# Patient Record
Sex: MALE
[De-identification: names, ages, dates, MRNs, and addresses within clinical notes are randomized per-mention and may not be internally consistent; named-entity substitution may affect disease eponyms.]

---

## 2017-07-02 ENCOUNTER — HOSPITAL ENCOUNTER (EMERGENCY)
Dept: HOSPITAL 31 - C.ER | Age: 52
Discharge: HOME | End: 2017-07-02
Payer: COMMERCIAL

## 2017-07-02 VITALS
SYSTOLIC BLOOD PRESSURE: 126 MMHG | TEMPERATURE: 97.7 F | RESPIRATION RATE: 18 BRPM | DIASTOLIC BLOOD PRESSURE: 84 MMHG | HEART RATE: 60 BPM

## 2017-07-02 VITALS — OXYGEN SATURATION: 98 %

## 2017-07-02 DIAGNOSIS — K59.00: Primary | ICD-10-CM

## 2017-07-02 LAB
ALBUMIN SERPL-MCNC: 3.8 G/DL (ref 3.5–5)
ALBUMIN/GLOB SERPL: 1.2 {RATIO} (ref 1–2.1)
ALT SERPL-CCNC: 39 U/L (ref 21–72)
AST SERPL-CCNC: 18 U/L (ref 17–59)
BASOPHILS # BLD AUTO: 0.1 K/UL (ref 0–0.2)
BASOPHILS NFR BLD: 0.7 % (ref 0–2)
BILIRUB UR-MCNC: NEGATIVE MG/DL
BUN SERPL-MCNC: 13 MG/DL (ref 9–20)
CALCIUM SERPL-MCNC: 7.8 MG/DL (ref 8.6–10.4)
EOSINOPHIL # BLD AUTO: 0.5 K/UL (ref 0–0.7)
EOSINOPHIL NFR BLD: 6.5 % (ref 0–4)
ERYTHROCYTE [DISTWIDTH] IN BLOOD BY AUTOMATED COUNT: 13.3 % (ref 11.5–14.5)
GFR NON-AFRICAN AMERICAN: > 60
GLUCOSE UR STRIP-MCNC: NORMAL MG/DL
HGB BLD-MCNC: 14.4 G/DL (ref 12–18)
LEUKOCYTE ESTERASE UR-ACNC: (no result) LEU/UL
LIPASE: 113 U/L (ref 23–300)
LYMPHOCYTES # BLD AUTO: 2.3 K/UL (ref 1–4.3)
LYMPHOCYTES NFR BLD AUTO: 32.6 % (ref 20–40)
MCH RBC QN AUTO: 28.7 PG (ref 27–31)
MCHC RBC AUTO-ENTMCNC: 32.9 G/DL (ref 33–37)
MCV RBC AUTO: 87.3 FL (ref 80–94)
MONOCYTES # BLD: 0.6 K/UL (ref 0–0.8)
MONOCYTES NFR BLD: 8.1 % (ref 0–10)
NEUTROPHILS # BLD: 3.6 K/UL (ref 1.8–7)
NEUTROPHILS NFR BLD AUTO: 52.1 % (ref 50–75)
NRBC BLD AUTO-RTO: 0.1 % (ref 0–2)
PH UR STRIP: 6 [PH] (ref 5–8)
PLATELET # BLD: 231 K/UL (ref 130–400)
PMV BLD AUTO: 8.7 FL (ref 7.2–11.7)
PROT UR STRIP-MCNC: NEGATIVE MG/DL
RBC # BLD AUTO: 5.03 MIL/UL (ref 4.4–5.9)
RBC # UR STRIP: NEGATIVE /UL
SP GR UR STRIP: 1 (ref 1–1.03)
URINE NITRATE: NEGATIVE
UROBILINOGEN UR-MCNC: NORMAL MG/DL (ref 0.2–1)
WBC # BLD AUTO: 7 K/UL (ref 4.8–10.8)

## 2017-07-02 NOTE — RAD
PROCEDURE:  Radiographs of the chest and abdomen (obstructive series)



HISTORY:

Abdominal pain



COMPARISON:

No prior.



TECHNIQUE:

AP radiograph of the chest, with upright and supine radiographs of 

the abdomen.



FINDINGS:



CHEST:

Lungs: Clear.



Cardiovascular: Normal size heart. No pulmonary vascular congestion.



Pleura: No pleural fluid. No pneumothorax.



Other findings: None.



ABDOMEN AND PELVIS:

Bowel: There is moderate amount of stool in the colon. No evidence of 

mechanical obstruction.



Free air: None.



Bones:  Unremarkable.



Other findings: None.



IMPRESSION:

Constipation. No evidence of mechanical bowel obstruction.



Clear lungs.

## 2017-07-02 NOTE — C.PDOC
History Of Present Illness


51 year old male presents to the ED with complaints of intermittent burning 

sensation to the entire right abdomen for a few days that is worsening tonight. 

Patient states pain is persistent and burning tonight but denies nausea, 

vomiting, diarrhea, or constipation. 


Time Seen by Provider: 07/02/17 01:38


Chief Complaint (Nursing): Abdominal Pain


History Per: Patient


History/Exam Limitations: no limitations


Onset/Duration Of Symptoms: Days, Intermittent Episodes


Current Symptoms Are (Timing): Worse


Location Of Pain/Discomfort: RUQ, RLQ


Quality Of Discomfort: Burning


Associated Symptoms: denies: Fever, Chills, Nausea, Vomiting, Diarrhea, 

Constipation


Recent travel outside of the [place]: No





Past Medical History


Reviewed: Historical Data, Nursing Documentation, Vital Signs


Vital Signs: 


 Last Vital Signs











Temp  97.7 F   07/02/17 03:57


 


Pulse  60   07/02/17 03:57


 


Resp  18   07/02/17 03:57


 


BP  126/84   07/02/17 03:57


 


Pulse Ox  98   07/02/17 05:33














- Medical History


PMH: Bronchitis


Family History: States: Unknown Family Hx





- Social History


Hx Tobacco Use: No


Hx Alcohol Use: No


Hx Substance Use: No





- Immunization History


Hx Tetanus Toxoid Vaccination: No


Hx Influenza Vaccination: No


Hx Pneumococcal Vaccination: No





Review Of Systems


Constitutional: Negative for: Fever, Chills


Cardiovascular: Negative for: Chest Pain, Palpitations


Respiratory: Negative for: Cough, Shortness of Breath


Gastrointestinal: Positive for: Abdominal Pain.  Negative for: Nausea, Vomiting

, Diarrhea, Constipation





Physical Exam





- Physical Exam


Appears: Non-toxic, No Acute Distress


Skin: Warm, Dry


Head: Atraumatic


Eye(s): bilateral: Normal Inspection, PERRL, EOMI


Oral Mucosa: Moist


Neck: Supple


Chest: Symmetrical, No Deformity


Cardiovascular: Rhythm Regular


Respiratory: Normal Breath Sounds, No Rales, No Rhonchi, No Wheezing


Gastrointestinal/Abdominal: Soft, No Tenderness, No Distention, No Guarding, No 

Rebound


Neurological/Psych: Oriented x3





ED Course And Treatment





- Laboratory Results


Result Diagrams: 


 07/02/17 02:16





 07/02/17 02:16


O2 Sat by Pulse Oximetry: 98 (room air )


Pulse Ox Interpretation: Normal





- Other Rad


  ** Abdominal X-Ray 


X-Ray: Interpreted by Me, Viewed By Me


Interpretation: Moderate fecal impaction


Progress Note: magnesium citrate, pepcid, and aluminum hydroxide given.Results 

of abdominal X-Ray and labs were discussed with patient. Patient is stable and 

tolerating PO. Patient denies any pain and he agrees with plan and will follow 

up with PMD.





Disposition


Counseled Patient/Family Regarding: Diagnosis, Need For Followup, Rx Given





- Disposition


Referrals: 


[facility] at [facility] [Outside]


Disposition: HOME/ ROUTINE


Disposition Time: 03:47


Condition: STABLE


Additional Instructions: 


Please follow up with PMD or clinic








Take meds as directed 





Eat high fiber diet ( mucha fibra)








Return to ER if worse 


Prescriptions: 


Polyethylene Glycol 3350 [Miralax] 17 gm PO DAILY #1 bottle


Instructions:  Constipation (ED), High Fiber Diet (ED)


Print Language: Moroccan





- Clinical Impression


Clinical Impression: 


 Constipation








- Scribe Statement


The provider has reviewed the documentation as recorded by the Scribcedric Contreras





All medical record entries made by the Alfonsoibcedric were at my direction and 

personally dictated by me. I have reviewed the chart and agree that the record 

accurately reflects my personal performance of the history, physical exam, 

medical decision making, and the department course for this patient. I have 

also personally directed, reviewed, and agree with the discharge instructions 

and disposition.

## 2018-05-03 ENCOUNTER — HOSPITAL ENCOUNTER (EMERGENCY)
Dept: HOSPITAL 31 - C.ER | Age: 53
Discharge: HOME | End: 2018-05-03
Payer: COMMERCIAL

## 2018-05-03 VITALS — RESPIRATION RATE: 18 BRPM

## 2018-05-03 VITALS — HEART RATE: 79 BPM | TEMPERATURE: 98 F | DIASTOLIC BLOOD PRESSURE: 90 MMHG | SYSTOLIC BLOOD PRESSURE: 130 MMHG

## 2018-05-03 VITALS — OXYGEN SATURATION: 98 %

## 2018-05-03 VITALS — BODY MASS INDEX: 29.1 KG/M2

## 2018-05-03 DIAGNOSIS — M79.671: Primary | ICD-10-CM

## 2018-05-03 LAB
ALBUMIN SERPL-MCNC: 3.8 G/DL (ref 3.5–5)
ALBUMIN/GLOB SERPL: 1 {RATIO} (ref 1–2.1)
ALT SERPL-CCNC: 56 U/L (ref 21–72)
AST SERPL-CCNC: 41 U/L (ref 17–59)
BASOPHILS # BLD AUTO: 0.1 K/UL (ref 0–0.2)
BASOPHILS NFR BLD: 0.6 % (ref 0–2)
BILIRUB UR-MCNC: NEGATIVE MG/DL
BUN SERPL-MCNC: 13 MG/DL (ref 9–20)
CALCIUM SERPL-MCNC: 8.6 MG/DL (ref 8.6–10.4)
EOSINOPHIL # BLD AUTO: 0.2 K/UL (ref 0–0.7)
EOSINOPHIL NFR BLD: 1.4 % (ref 0–4)
ERYTHROCYTE [DISTWIDTH] IN BLOOD BY AUTOMATED COUNT: 12.9 % (ref 11.5–14.5)
GFR NON-AFRICAN AMERICAN: > 60
GLUCOSE UR STRIP-MCNC: NORMAL MG/DL
HGB BLD-MCNC: 14.5 G/DL (ref 12–18)
LEUKOCYTE ESTERASE UR-ACNC: (no result) LEU/UL
LYMPHOCYTES # BLD AUTO: 1.5 K/UL (ref 1–4.3)
LYMPHOCYTES NFR BLD AUTO: 12.3 % (ref 20–40)
MCH RBC QN AUTO: 29.6 PG (ref 27–31)
MCHC RBC AUTO-ENTMCNC: 34.4 G/DL (ref 33–37)
MCV RBC AUTO: 86.1 FL (ref 80–94)
MONOCYTES # BLD: 1 K/UL (ref 0–0.8)
MONOCYTES NFR BLD: 8.4 % (ref 0–10)
NEUTROPHILS # BLD: 9.1 K/UL (ref 1.8–7)
NEUTROPHILS NFR BLD AUTO: 77.3 % (ref 50–75)
NRBC BLD AUTO-RTO: 0.1 % (ref 0–2)
PH UR STRIP: 6 [PH] (ref 5–8)
PLATELET # BLD: 232 K/UL (ref 130–400)
PMV BLD AUTO: 8.4 FL (ref 7.2–11.7)
PROT UR STRIP-MCNC: NEGATIVE MG/DL
RBC # BLD AUTO: 4.89 MIL/UL (ref 4.4–5.9)
RBC # UR STRIP: NEGATIVE /UL
SP GR UR STRIP: 1.02 (ref 1–1.03)
URATE SERPL-MCNC: 4.5 MG/DL (ref 3.5–8.5)
UROBILINOGEN UR-MCNC: NORMAL MG/DL (ref 0.2–1)
WBC # BLD AUTO: 11.8 K/UL (ref 4.8–10.8)

## 2018-05-03 PROCEDURE — 81001 URINALYSIS AUTO W/SCOPE: CPT

## 2018-05-03 PROCEDURE — 96374 THER/PROPH/DIAG INJ IV PUSH: CPT

## 2018-05-03 PROCEDURE — 73630 X-RAY EXAM OF FOOT: CPT

## 2018-05-03 PROCEDURE — 84550 ASSAY OF BLOOD/URIC ACID: CPT

## 2018-05-03 PROCEDURE — 80053 COMPREHEN METABOLIC PANEL: CPT

## 2018-05-03 PROCEDURE — 85651 RBC SED RATE NONAUTOMATED: CPT

## 2018-05-03 PROCEDURE — 99285 EMERGENCY DEPT VISIT HI MDM: CPT

## 2018-05-03 PROCEDURE — 96361 HYDRATE IV INFUSION ADD-ON: CPT

## 2018-05-03 PROCEDURE — 85025 COMPLETE CBC W/AUTO DIFF WBC: CPT

## 2018-05-03 NOTE — CP.PCM.CON
History of Present Illness





- History of Present Illness


History of Present Illness: 


Podiatry Consult note for Dr. Milligan





52 year old male with PMHx including stomach ulcers was seen in the ED for 

right foot pain for the past three days.  Patient states that he denies any 

trauma to his right foot.  He states that the pain and swelling are worse 

today.  States that he has had similar symptoms in his body before but not his 

foot. Denies any n/v/f/c/sob/cp.





Past Patient History





- Infectious Disease


Hx of Infectious Diseases: None





- Past Social History


Smoking Status: Never Smoked





- CARDIAC


Hx Cardiac Disorders: No





- PULMONARY


Hx Bronchitis: Yes





- NEUROLOGICAL


Hx Neurological Disorder: No





- HEENT


Hx HEENT Problems: No





- RENAL


Hx Chronic Kidney Disease: No





- ENDOCRINE/METABOLIC


Hx Endocrine Disorders: No





- HEMATOLOGICAL/ONCOLOGICAL


Hx Blood Disorders: No





- INTEGUMENTARY


Hx Dermatological Problems: No





- MUSCULOSKELETAL/RHEUMATOLOGICAL


Hx Musculoskeletal Disorders: No





- GASTROINTESTINAL


Hx Gastrointestinal Disorders: No





- GENITOURINARY/GYNECOLOGICAL


Hx Genitourinary Disorders: No





- PSYCHIATRIC


Hx Substance Use: No





- SURGICAL HISTORY


Hx Surgeries: Yes


Other/Comment: RT eye surgery





- ANESTHESIA


Hx Anesthesia: Yes





Meds


Allergies/Adverse Reactions: 


 Allergies











Allergy/AdvReac Type Severity Reaction Status Date / Time


 


No Known Allergies Allergy   Verified 05/03/18 14:04














Physical Exam





- Constitutional


Appears: Well, Non-toxic, No Acute Distress





- Extremities Exam


Additional comments: 


lower extremity focused exam:





Vasc: DP and PT pulses palpable 2/4 b/l.  CFT < 3 seconds to all digits b/l. 

Edema noted to right foot diffusely. Skin temperature warm to warm from 

proximal to distal with increased in warmth noted to right foot.





Neuro: Gross sensation intact b/l





Derm: Erythema noted to dorsal lateral aspect of right foot, no ecchymosis noted

, no open lesions noted. 





Ortho: Tenderness to palpation on dorsum of right foot, denies any pain on calf 

squeeze





- Neurological Exam


Neurological exam: Alert, Oriented x3





- Psychiatric Exam


Psychiatric exam: Normal Affect, Normal Mood





Results





- Vital Signs


Recent Vital Signs: 


 Last Vital Signs











Temp  98.6 F   05/03/18 14:05


 


Pulse  68   05/03/18 14:05


 


Resp  18   05/03/18 14:05


 


BP  142/83   05/03/18 14:05


 


Pulse Ox  98   05/03/18 14:56














- Labs


Result Diagrams: 


 05/03/18 15:15





 05/03/18 15:15


Labs: 


 Laboratory Results - last 24 hr











  05/03/18 05/03/18 05/03/18





  15:15 15:15 15:15


 


WBC  11.8 H D  


 


RBC  4.89  


 


Hgb  14.5  


 


Hct  42.1  


 


MCV  86.1  


 


MCH  29.6  


 


MCHC  34.4  


 


RDW  12.9  


 


Plt Count  232  


 


MPV  8.4  


 


Neut % (Auto)  77.3 H  


 


Lymph % (Auto)  12.3 L  


 


Mono % (Auto)  8.4  


 


Eos % (Auto)  1.4  


 


Baso % (Auto)  0.6  


 


Neut # (Auto)  9.1 H  


 


Lymph # (Auto)  1.5  


 


Mono # (Auto)  1.0 H  


 


Eos # (Auto)  0.2  


 


Baso # (Auto)  0.1  


 


ESR  20 H  


 


Sodium   140 


 


Potassium   4.4 


 


Chloride   102 


 


Carbon Dioxide   25 


 


Anion Gap   16 


 


BUN   13 


 


Creatinine   0.8 


 


Est GFR ( Amer)   > 60 


 


Est GFR (Non-Af Amer)   > 60 


 


Random Glucose   103 


 


Uric Acid   4.5 


 


Calcium   8.6 


 


Total Bilirubin   1.2 


 


AST   41 


 


ALT   56 


 


Alkaline Phosphatase   71 


 


Total Protein   7.7 


 


Albumin   3.8 


 


Globulin   3.8 


 


Albumin/Globulin Ratio   1.0 


 


Urine Color    Yellow


 


Urine Clarity    Clear


 


Urine pH    6.0


 


Ur Specific Gravity    1.018


 


Urine Protein    Negative


 


Urine Glucose (UA)    Normal


 


Urine Ketones    Negative


 


Urine Blood    Negative


 


Urine Nitrate    Negative


 


Urine Bilirubin    Negative


 


Urine Urobilinogen    Normal


 


Ur Leukocyte Esterase    Neg


 


Urine WBC (Auto)    < 1


 


Urine RBC (Auto)    1














Assessment & Plan





- Assessment and Plan (Free Text)


Assessment: 


52 year old male with right foot pain


Plan: 


patient examined and evaluated


discussed in detail with attending, Dr. Milligan


chart, labs, vitals reviewed


WBC-11.8, UA-4.5, ESR-20


Rx Augmentin 875 mg PO BID x 10 days per ED PA


Rx colcrys


surgical shoe dispensed


pt to take motrin prn pain


pt to follow up in podiatry clinic

## 2018-05-03 NOTE — RAD
PROCEDURE:  Right Foot Radiographs.



HISTORY:

right foot pain/swelling/atraumatic  



COMPARISON:

None.



FINDINGS:



BONES:

No fracture.  Small plantar calcaneal spur. 



JOINTS:

Normal. 



SOFT TISSUES:

Normal. 



OTHER FINDINGS:

None.



IMPRESSION:

No acute fracture.

## 2018-05-03 NOTE — C.PDOC
History Of Present Illness


52 year old male presents to the ED c/o right foot pain and swelling. Patient 

reports that last night patient noticed small amount of swelling but this 

morning when he woke up the swelling and pain worsened. Patient reports he 

works usually involved climbing stairs and nguyen snot remembers injuring his 

foot. Patient reports having similar symptoms in the past on his right shoulder 

and knee. Patient denies weakness, numbness, fall, trauma, fever, chills. 


Time Seen by Provider: 05/03/18 14:31


Chief Complaint (Nursing): Lower Extremity Problem/Injury


History Per: Patient


History/Exam Limitations: no limitations


Onset/Duration Of Symptoms: Days


Current Symptoms Are (Timing): Still Present


Recent travel outside of the [place]: No





- Ankle/Foot


Description Of Injury: Other


Currently Unable To: Bear Weight


Alleviating Factor(s): Ice Therapy





Past Medical History


Reviewed: Historical Data, Nursing Documentation, Vital Signs


Vital Signs: 


 Last Vital Signs











Temp  98 F   05/03/18 18:41


 


Pulse  79   05/03/18 18:41


 


Resp  18   05/03/18 18:41


 


BP  130/90   05/03/18 18:41


 


Pulse Ox  97   05/03/18 18:41














- Medical History


PMH: Bronchitis


   Denies: Chronic Kidney Disease


Surgical History: No Surg Hx


Family History: States: Unknown Family Hx





- Social History


Hx Tobacco Use: No


Hx Alcohol Use: No


Hx Substance Use: No





- Immunization History


Hx Tetanus Toxoid Vaccination: No


Hx Influenza Vaccination: No


Hx Pneumococcal Vaccination: No





Review Of Systems


Constitutional: Negative for: Fever, Chills


Cardiovascular: Negative for: Chest Pain


Respiratory: Negative for: Shortness of Breath


Gastrointestinal: Negative for: Nausea, Vomiting


Musculoskeletal: Positive for: Foot Pain


Skin: Negative for: Rash


Neurological: Negative for: Weakness, Numbness





Physical Exam





- Physical Exam


Appears: Non-toxic, No Acute Distress


Skin: Normal Color, Warm, Dry


Head: Atraumatic, Normacephalic


Eye(s): bilateral: Normal Inspection


Nose: No Discharge


Oral Mucosa: Moist


Neck: Normal ROM, Supple


Chest: Symmetrical


Cardiovascular: Rhythm Regular, No Murmur


Respiratory: Normal Breath Sounds, No Rales, No Rhonchi, No Wheezing


Extremity: No Normal ROM (Painful right foot), Tenderness (dorsal aspect right 

foot), Capillary Refill (< 2 seconds), Swelling (dorsal aspect right foot. 

warmth, erythematous)


Pulses: Left Dorsalis Pedis: Normal, Right Dorsalis Pedis: Normal


Neurological/Psych: Oriented x3, Normal Motor, Normal Sensation


Gait: Unsteady (due to pain. Not able to bear weight)





ED Course And Treatment





- Laboratory Results


Result Diagrams: 


 05/03/18 15:15





 05/03/18 15:15


O2 Sat by Pulse Oximetry: 98 (On RA)


Pulse Ox Interpretation: Normal





- Other Rad


  ** Right foot X-Ray


X-Ray: Read By Radiologist


Interpretation: Accession No. : [id number].  Patient Name / ID : HANNAH CHOI  / [id number].  Exam Date : 05/03/2018 15:29:09 ( Approved ).  Study 

Comment :  Sex / Age : M  / 52Y.  Creator : Ananth Allred MD.  Dictator 

: Ananth Allred MD.   :  Approver : Ananth Allred MD.  

Approver2 :  Report Date : 05/03/2018 16:35:38.  My Comment :  *****************

******************************************************************.  PROCEDURE:

  Right Foot Radiographs.  HISTORY:  right foot pain/swelling/atraumatic.  

COMPARISON:  None.  FINDINGS:  BONES:  No fracture.  Small plantar calcaneal 

spur.  JOINTS:  Normal.  SOFT TISSUES:  Normal.  OTHER FINDINGS:  None.  

IMPRESSION:  No acute fracture.





Medical Decision Making


Medical Decision Making: 


Plan:


* Labs


* IV fluids


* Toradol 30 mg IVP


* Right foot X-Ray


* UA


* 


* Patient was evaluated in ED by Podiatry resident. Augmentin po and Toradol IV 

given. Ortho shoe and crutches given. Patient will f/u in podiatry clinic 

within 1-2 days.











Disposition





- Disposition


Referrals: 


Podiatry Clinic [Outside]


 at [facility] [Outside]


Disposition: HOME/ ROUTINE


Disposition Time: 18:22


Condition: STABLE


Additional Instructions: 


Follow up in Podiatry clinic on Monday 5/7/18. Return to ED if feel worse.


Prescriptions: 


Amoxicillin/Clavulanate [Augmentin 875 MG-125 MG] 1 tab PO BID #20 tab


Colchicine 0.6 mg PO TID #3 capsule


Famotidine [Pepcid] 20 mg PO BID #20 tab


Instructions:  Gout (DC), Cellulitis (Skin Infection), Adult (DC), Low Purine 

Diet


Forms:  [employer] (English), Work Excuse


Print Language: Grenadian





- Clinical Impression


Clinical Impression: 


 Foot pain








- PA / NP / Resident Statement


MD/DO has reviewed & agrees with the documentation as recorded.





- Scribe Statement


The provider has reviewed the documentation as recorded by the Scribe


Yong Brody





All medical record entries made by the Scribe were at my direction and 

personally dictated by me. I have reviewed the chart and agree that the record 

accurately reflects my personal performance of the history, physical exam, 

medical decision making, and the department course for this patient. I have 

also personally directed, reviewed, and agree with the discharge instructions 

and disposition.

## 2018-06-08 ENCOUNTER — HOSPITAL ENCOUNTER (EMERGENCY)
Dept: HOSPITAL 31 - C.ER | Age: 53
Discharge: HOME | End: 2018-06-08
Payer: COMMERCIAL

## 2018-06-08 VITALS
DIASTOLIC BLOOD PRESSURE: 86 MMHG | SYSTOLIC BLOOD PRESSURE: 129 MMHG | RESPIRATION RATE: 18 BRPM | HEART RATE: 84 BPM | TEMPERATURE: 97.9 F

## 2018-06-08 VITALS — BODY MASS INDEX: 29.1 KG/M2

## 2018-06-08 VITALS — OXYGEN SATURATION: 96 %
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
## 2018-06-08 DIAGNOSIS — S49.92XA: Primary | ICD-10-CM
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
## 2018-06-08 PROCEDURE — 99284 EMERGENCY DEPT VISIT MOD MDM: CPT

## 2018-06-08 PROCEDURE — 96372 THER/PROPH/DIAG INJ SC/IM: CPT

## 2018-06-08 PROCEDURE — 73030 X-RAY EXAM OF SHOULDER: CPT

## 2018-06-08 NOTE — C.PDOC
Time Seen by Provider: 06/08/18 05:22


Chief Complaint (Nursing): Upper Extremity Problem/Injury





Past Medical History


Vital Signs: 





 Last Vital Signs











Temp  97.6 F   06/08/18 05:15


 


Pulse  65   06/08/18 05:15


 


Resp  20   06/08/18 05:15


 


BP  137/94 H  06/08/18 05:15


 


Pulse Ox  96   06/08/18 05:15














- Medical History


PMH: Arthritis, Bronchitis


   Denies: Chronic Kidney Disease


Family History: States: Unknown Family Hx





- Social History


Hx Tobacco Use: No


Hx Alcohol Use: No


Hx Substance Use: No





- Immunization History


Hx Tetanus Toxoid Vaccination: No


Hx Influenza Vaccination: No


Hx Pneumococcal Vaccination: No





ED Course And Treatment


O2 Sat by Pulse Oximetry: 96





Disposition





- Disposition

## 2018-06-08 NOTE — RAD
PROCEDURE:  Radiographs of the Left Shoulder



HISTORY:

diffuse shoulder pain with dec rom after press







COMPARISON:

No prior.



FINDINGS:



BONES:

Bone alignment and mineralization are normal.  There is no acute 

displaced fracture or bone destruction.



JOINTS:

Normal. Glenohumeral and acromioclavicular joints preserved. 



SOFT TISSUES:

Normal.



OTHER FINDINGS:

None. 



IMPRESSION:

No acute fracture or dislocation.

## 2018-06-08 NOTE — C.PDOC
History Of Present Illness





Patient is a 51 y/o male, with a Hx of arthritis, who presents to the ED with a 

complaint of left shoulder pain since yesterday. Patient reports to have been 

doing "presses" when exercising at the gym last week, out of his ordinary 

workout routine. Patient then continued to work yesterday as a , and 

woke up this morning unable to move left arm, prompting visit. Patient admits 

to lateral neck pain; denies similar past symptoms, fever, numbness or 

weakness. Patient tried naproxen with mild relief. 


Time Seen by Provider: 06/08/18 05:22


Chief Complaint (Nursing): Upper Extremity Problem/Injury


History Per: Patient, 


History/Exam Limitations: no limitations


Onset/Duration Of Symptoms: Days (1), Worse Since


Current Symptoms Are (Timing): Still Present


Exacerbating Factor(s): Strenuous Use Of Affected Area, Movement


Recent travel outside of the [place]: No





Past Medical History


Reviewed: Historical Data, Nursing Documentation, Vital Signs


Vital Signs: 


 Last Vital Signs











Temp  97.9 F   06/08/18 06:38


 


Pulse  84   06/08/18 06:38


 


Resp  18   06/08/18 06:38


 


BP  129/86   06/08/18 06:38


 


Pulse Ox  96   06/08/18 07:02














- Medical History


PMH: Arthritis, Bronchitis


   Denies: Chronic Kidney Disease


Surgical History: No Surg Hx


Family History: States: No Known Family Hx





- Social History


Hx Tobacco Use: No


Hx Alcohol Use: No


Hx Substance Use: No





- Immunization History


Hx Tetanus Toxoid Vaccination: No


Hx Influenza Vaccination: No


Hx Pneumococcal Vaccination: No





Review Of Systems


Constitutional: Negative for: Fever


Gastrointestinal: Negative for: Nausea, Vomiting


Musculoskeletal: Positive for: Neck Pain, Shoulder Pain (left).  Negative for: 

Arm Pain, Foot Pain


Skin: Negative for: Rash


Neurological: Negative for: Weakness, Numbness





Physical Exam





- Physical Exam


Appears: Non-toxic, Other (obviously uncomfortable)


Skin: Warm, Dry


Head: Atraumatic, Normacephalic


Oral Mucosa: Moist


Neck: No Midline Cervical Tenderness, No Paracervical Tenderness, Other (left 

mild lateral trapezius tenderness)


Back: Normal Inspection, No Vertebral Tenderness


Extremity: Tenderness (diffuse left shoulder tender with dec rom, from at 

fingers of left hand, elbow and wrist. no swelling, erythema, warmth or edema. )

, No Deformity, No Swelling


Pulses: Left Radial: Normal, Right Radial: Normal


Neurological/Psych: Oriented x3, Normal Speech, Normal Cognition, Normal Motor, 

Normal Sensation





ED Course And Treatment


O2 Sat by Pulse Oximetry: 96


Progress Note: Left shoulder XR ordered. Flexeril and toradol administered.





Medical Decision Making


Medical Decision Making: 





 pt with left shoulder pain s/p exercising and working. no sensory deficits, n-

v intact. pt with diffuse tenderness to left shoulder with possible rotator 

cuff tear. discussed xray findings with pt, no acute fx or dislocaiton on my 

review.   pt more comfortable after toradol and flexeril, will d/c with 

naproxen and flexeril. discussed with pt possibility of a rotator cuff tear, 

needs to see pmd for ortho referral or direct to ortho, may need outpatient mri 

shoulder. advised to do some movement of shoulder so doesnt freeze. pt 

understands. 





Disposition


Counseled Patient/Family Regarding: Studies Performed, Diagnosis, Need For 

Followup, Rx Given





- Disposition


Referrals: 


Wero Johnson III, MD [Staff Provider] - 


Disposition: HOME/ ROUTINE


Disposition Time: 06:36


Condition: STABLE


Additional Instructions: 





Por favor, use un cabestrillo murali el da; aplique compresas fras al hombro 

por 15 minutos a la vez 3-4 veces por da. [place] Naproxen ermias prescrbied. [place] 

Flexeril (cyclobenzaprine-muscle relaxant cada 8 horas) No trabaje ni conduzca 

cuando est tomando reynold medicamento, ya que le da sueo. Consulte con tejada m

dico de atencin primaria para que lo remitan a ortopedia o directamente con el 

Dr. Johnson (ortopedista). Intente mueva el hombro suavemente, no quiere que se 

congele, yamila los ejercicios adjuntos





Please wear sling during daytime; apply cold compresses to shoulder for 15 

minutes at a time 3-4 times per day. Take Naproxen as prescrbied.  Take 

Flexeril (cyclobenzaprine- muscle relaxant every 8 hours- do not work or drive 

when taking this medicine as it makes you sleepy. Follow up with your primary 

care doctor for a referral to orthopedics or directly with Dr Johnson (

orthopedist).  Please try to move shoulder gently; you do not want it to freeze

- see attached exercises.   


Prescriptions: 


Cyclobenzaprine [Cyclobenzaprine HCl] 10 mg PO Q8 #9 tab


Naproxen 500 mg PO BID #20 tab


Instructions:  Frozen Shoulder (DC), Rotator Cuff Injury (DC), Active Range of 

Motion Exercises, Neck and Shoulders, Passive Range of Motion Exercises, Neck 

and Shoulders, Rotator Cuff Tendinitis Stretching Exercises


Forms:  [employer] [facility] Inst Monegasque, [facility] (Monegasque), Work Excuse


Print Language: Faroese





- Clinical Impression


Clinical Impression: 


 Injury of left shoulder








- Scribe Statement


The provider has reviewed the documentation as recorded by the Scribe





Sandra Perry





All medical record entries made by the Scribe were at my direction and 

personally dictated by me. I have reviewed the chart and agree that the record 

accurately reflects my personal performance of the history, physical exam, 

medical decision making, and the department course for this patient. I have 

also personally directed, reviewed, and agree with the discharge instructions 

and disposition.

## 2019-03-24 ENCOUNTER — HOSPITAL ENCOUNTER (EMERGENCY)
Dept: HOSPITAL 31 - C.ER | Age: 54
Discharge: HOME | End: 2019-03-24
Payer: COMMERCIAL

## 2019-03-24 VITALS — DIASTOLIC BLOOD PRESSURE: 86 MMHG | SYSTOLIC BLOOD PRESSURE: 133 MMHG | HEART RATE: 65 BPM

## 2019-03-24 VITALS — RESPIRATION RATE: 18 BRPM | TEMPERATURE: 98.2 F

## 2019-03-24 VITALS — OXYGEN SATURATION: 97 %

## 2019-03-24 VITALS — BODY MASS INDEX: 29.1 KG/M2

## 2019-03-24 DIAGNOSIS — R10.9: Primary | ICD-10-CM

## 2019-03-24 LAB
ALBUMIN SERPL-MCNC: 4 G/DL (ref 3.5–5)
ALBUMIN/GLOB SERPL: 1.4 {RATIO} (ref 1–2.1)
ALT SERPL-CCNC: 31 U/L (ref 21–72)
AST SERPL-CCNC: 25 U/L (ref 17–59)
BASOPHILS # BLD AUTO: 0 K/UL (ref 0–0.2)
BASOPHILS NFR BLD: 0.6 % (ref 0–2)
BILIRUB UR-MCNC: NEGATIVE MG/DL
BUN SERPL-MCNC: 9 MG/DL (ref 9–20)
CALCIUM SERPL-MCNC: 8.7 MG/DL (ref 8.6–10.4)
EOSINOPHIL # BLD AUTO: 0.3 K/UL (ref 0–0.7)
EOSINOPHIL NFR BLD: 4.3 % (ref 0–4)
ERYTHROCYTE [DISTWIDTH] IN BLOOD BY AUTOMATED COUNT: 13.4 % (ref 11.5–14.5)
GFR NON-AFRICAN AMERICAN: > 60
GLUCOSE UR STRIP-MCNC: NORMAL MG/DL
HGB BLD-MCNC: 15.7 G/DL (ref 12–18)
LEUKOCYTE ESTERASE UR-ACNC: (no result) LEU/UL
LIPASE: 100 U/L (ref 23–300)
LYMPHOCYTES # BLD AUTO: 2.2 K/UL (ref 1–4.3)
LYMPHOCYTES NFR BLD AUTO: 28.7 % (ref 20–40)
MCH RBC QN AUTO: 30.6 PG (ref 27–31)
MCHC RBC AUTO-ENTMCNC: 33.9 G/DL (ref 33–37)
MCV RBC AUTO: 90.2 FL (ref 80–94)
MONOCYTES # BLD: 0.6 K/UL (ref 0–0.8)
MONOCYTES NFR BLD: 8.4 % (ref 0–10)
NEUTROPHILS # BLD: 4.4 K/UL (ref 1.8–7)
NEUTROPHILS NFR BLD AUTO: 58 % (ref 50–75)
NRBC BLD AUTO-RTO: 0.1 % (ref 0–2)
PH UR STRIP: 7 [PH] (ref 5–8)
PLATELET # BLD: 235 K/UL (ref 130–400)
PMV BLD AUTO: 8.9 FL (ref 7.2–11.7)
PROT UR STRIP-MCNC: NEGATIVE MG/DL
RBC # BLD AUTO: 5.14 MIL/UL (ref 4.4–5.9)
RBC # UR STRIP: NEGATIVE /UL
SP GR UR STRIP: 1 (ref 1–1.03)
UROBILINOGEN UR-MCNC: NORMAL MG/DL (ref 0.2–1)
WBC # BLD AUTO: 7.5 K/UL (ref 4.8–10.8)

## 2019-03-24 PROCEDURE — 81001 URINALYSIS AUTO W/SCOPE: CPT

## 2019-03-24 PROCEDURE — 99283 EMERGENCY DEPT VISIT LOW MDM: CPT

## 2019-03-24 PROCEDURE — 80053 COMPREHEN METABOLIC PANEL: CPT

## 2019-03-24 PROCEDURE — 76705 ECHO EXAM OF ABDOMEN: CPT

## 2019-03-24 PROCEDURE — 84484 ASSAY OF TROPONIN QUANT: CPT

## 2019-03-24 PROCEDURE — 83690 ASSAY OF LIPASE: CPT

## 2019-03-24 PROCEDURE — 96374 THER/PROPH/DIAG INJ IV PUSH: CPT

## 2019-03-24 PROCEDURE — 85025 COMPLETE CBC W/AUTO DIFF WBC: CPT

## 2019-03-24 PROCEDURE — 93005 ELECTROCARDIOGRAM TRACING: CPT

## 2019-03-24 PROCEDURE — 96361 HYDRATE IV INFUSION ADD-ON: CPT

## 2019-03-24 NOTE — US
Date of service: 



03/24/2019



HISTORY:

RUQ abd pain



COMPARISON:

None.



TECHNIQUE:

Sonographic evaluation of the right upper quadrant of the abdomen.



FINDINGS:



LIVER:

Measures 13.3 cm in length. Diffusely increased echogenicity of the 

liver parenchyma.  Consistent with fatty infiltration.  No mass.  No 

biliary dilatation.  Punctate calcification in the right lobe of the 

liver, likely calcified granuloma. 



GALLBLADDER:

No cholelithiasis.  There is a punctate non dependent mural 

calcification consistent with focal adenomyomatosis.  No mural 

thickening.  No pericholecystic fluid. 



COMMON BILE DUCT:

Measures 5 mm.  No stones. No dilatation.



PANCREAS:

Unremarkable as visualized. No mass. No ductal dilatation.



RIGHT KIDNEY:

Measures 10.9 cm in length. Normal echogenicity. No calculus, mass, 

or hydronephrosis.



AORTA:

No aneurysmal dilatation.



IVC:

Unremarkable.



OTHER FINDINGS:

None .



IMPRESSION:

Fatty infiltration of the liver.  Calcified hepatic granuloma.  Focal 

adenomyomatosis of the gallbladder.  No evidence of cholelithiasis or 

cholecystitis.

## 2019-03-24 NOTE — C.PDOC
History Of Present Illness


54 y/o male presents to the ED for complaints of RUQ pain, intermittent for the 

past 2-3 weeks. Denies any associated nausea, vomiting, or diarrhea. Patient 

notes pain is localized to the RUQ and epigastric area. Otherwise he denies any 

fevers, chills, chest pain, SOB, dizziness, or urinary symptoms.





Time Seen by Provider: 03/24/19 12:36


Chief Complaint (Nursing): Abdominal Pain


History Per: Patient


History/Exam Limitations: no limitations


Onset/Duration Of Symptoms: Intermittent Episodes


Current Symptoms Are (Timing): Still Present


Location Of Pain/Discomfort: RUQ, Epigastric


Quality Of Discomfort: "Pain"





Past Medical History


Reviewed: Historical Data, Nursing Documentation, Vital Signs


Vital Signs: 





                                Last Vital Signs











Temp  98.2 F   03/24/19 12:24


 


Pulse  64   03/24/19 12:24


 


Resp  18   03/24/19 12:24


 


BP  150/85   03/24/19 12:24


 


Pulse Ox  97   03/24/19 12:24














- Medical History


PMH: Arthritis, Bronchitis


   Denies: Chronic Kidney Disease


Other Surgeries: Right eye surgery


Family History: States: Unknown Family Hx





- Social History


Hx Tobacco Use: No


Hx Alcohol Use: No


Hx Substance Use: No





- Immunization History


Hx Tetanus Toxoid Vaccination: Yes


Hx Influenza Vaccination: Yes


Hx Pneumococcal Vaccination: No





Review Of Systems


Except As Marked, All Systems Reviewed And Found Negative.


Constitutional: Negative for: Fever, Chills


Eyes: Negative for: Vision Change


Cardiovascular: Negative for: Chest Pain


Respiratory: Negative for: Shortness of Breath


Gastrointestinal: Positive for: Abdominal Pain (epigastric/RUQ).  Negative for: 

Nausea, Vomiting, Diarrhea, Constipation


Genitourinary: Negative for: Dysuria, Hematuria


Musculoskeletal: Negative for: Back Pain


Neurological: Negative for: Weakness, Numbness





Physical Exam





- Physical Exam


Appears: Non-toxic, No Acute Distress


Skin: Warm, Dry, No Rash


Head: Atraumatic, Normacephalic


Eye(s): bilateral: Normal Inspection, PERRL, EOMI


Oral Mucosa: Moist


Neck: Normal ROM


Chest: Symmetrical, No Tenderness


Cardiovascular: Rhythm Regular, No Murmur


Respiratory: Normal Breath Sounds, No Accessory Muscle Use, No Wheezing


Gastrointestinal/Abdominal: Soft, Tenderness (to epigastrium and RUQ), No 

Guarding, No Rebound


Back: No CVA Tenderness


Extremity: Bilateral: Atraumatic, Normal Color And Temperature, Normal ROM


Pulses: Left Dorsalis Pedis: Normal, Right Dorsalis Pedis: Normal


Neurological/Psych: Oriented x3, Normal Speech





ED Course And Treatment





- Laboratory Results


Result Diagrams: 


                                 03/24/19 12:59





                                 03/24/19 12:59


Lab Results: 





                                        











Troponin I  < 0.0120 ng/mL (0.00-0.120)   03/24/19  12:59    








                                        











Total Bilirubin  0.8 mg/dL (0.2-1.3)   03/24/19  12:59    


 


AST  25 U/L (17-59)   03/24/19  12:59    


 


ALT  31 U/L (21-72)   03/24/19  12:59    


 


Alkaline Phosphatase  76 U/L ()   03/24/19  12:59    


 


Total Protein  6.7 g/dL (6.3-8.3)   03/24/19  12:59    


 


Albumin  4.0 g/dL (3.5-5.0)   03/24/19  12:59    


 


Globulin  2.8 gm/dL (2.2-3.9)   03/24/19  12:59    


 


Albumin/Globulin Ratio  1.4  (1.0-2.1)   03/24/19  12:59    








                                        











Lipase  100 U/L ()   03/24/19  12:59    








                                        











Urine Color  Colorless  (YELLOW)   03/24/19  12:59    


 


Urine Clarity  Clear  (Clear)   03/24/19  12:59    


 


Urine pH  7.0  (5.0-8.0)   03/24/19  12:59    


 


Ur Specific Gravity  1.001  (1.003-1.030)  L  03/24/19  12:59    


 


Urine Protein  Negative mg/dL (NEGATIVE)   03/24/19  12:59    


 


Urine Glucose (UA)  Normal mg/dL (Normal)   03/24/19  12:59    


 


Urine Ketones  Negative mg/dL (NEGATIVE)   03/24/19  12:59    


 


Urine Blood  Negative  (NEGATIVE)   03/24/19  12:59    


 


Urine Nitrate  Negative  (NEGATIVE)   03/24/19  12:59    


 


Urine Bilirubin  Negative  (NEGATIVE)   03/24/19  12:59    


 


Urine Urobilinogen  Normal mg/dL (0.2-1.0)   03/24/19  12:59    


 


Ur Leukocyte Esterase  Neg Claudia/uL (Negative)   03/24/19  12:59    











O2 Sat by Pulse Oximetry: 97 (on RA)


Pulse Ox Interpretation: Normal





- CT Scan/US


  ** Abd US [RUQ]


Other Rad Studies (CT/US): Read By Radiologist, Radiology Report Reviewed


CT/US Interpretation: Accession No. : [id number].  Patient Name / ID : 

HANNAH CHOI  / [id number].  Exam Date : 03/24/2019 13:25:16 ( Approved ). 

 Study Comment :  Sex / Age : M  / 53Y.  Creator : Ananth Allred MD.  

Dictator : Ananth Allred MD.   :  Approver : Ananth Allred MD.  Approver2 :  Report Date : 03/24/2019 16:50:59.  My Comment :  

*********************************************************

**************************.  Date of service:  03/24/2019.  HISTORY:  RUQ abd 

pain.  COMPARISON:  None.  TECHNIQUE:  Sonographic evaluation of the right upper

 quadrant of the abdomen.  FINDINGS:  LIVER:  Measures 13.3 cm in length. 

Diffusely increased echogenicity of the liver parenchyma.  Consistent with fatty

 infiltration.  No mass.  No biliary dilatation.  Punctate calcification in the 

right lobe of the liver, likely calcified granuloma.  GALLBLADDER:  No 

cholelithiasis.  There is a punctate non dependent mural calcification 

consistent with focal adenomyomatosis.  No mural thickening.  No pericholecystic

 fluid.  COMMON BILE DUCT:  Measures 5 mm.  No stones. No dilatation.  PANCREAS:

  Unremarkable as visualized. No mass. No ductal dilatation.  RIGHT KIDNEY:  

Measures 10.9 cm in length. Normal echogenicity. No calculus, mass, or 

hydronephrosis.  AORTA:  No aneurysmal dilatation.  IVC:  Unremarkable.  OTHER 

FINDINGS:  None .  IMPRESSION:  Fatty infiltration of the liver.  Calcified hep

atic granuloma.  Focal adenomyomatosis of the gallbladder.  No evidence of 

cholelithiasis or cholecystitis.


Progress Note: Blood work and urine sent to the lab for analysis. Ordered 

Abdominal US to r/o cholecystitis. Patient given 1L IV fluids, 40 mg IV 

Protonix, and 4 mg IV Zofran. Labs and imaging reviewed - no evidence of 

cholecystitis.





Disposition





- Disposition


Disposition: HOME/ ROUTINE


Disposition Time: 17:44


Condition: STABLE


Additional Instructions: 


Follow up with your PMD and Gastroenterologist within 1-2 days. Return to ED if 

feel worse.


Prescriptions: 


Dicyclomine [Bentyl] 20 mg PO TID #30 tab


Famotidine [Pepcid] 20 mg PO BID #20 tab


Ondansetron ODT [Zofran ODT] 1 odt PO BID PRN #6 odt


 PRN Reason: Nausea/Vomiting


Instructions:  Acute Abdomen (Belly Pain), Adult (DC)


Forms:  DxNA (English)


Print Language: Stateless





- Clinical Impression


Clinical Impression: 


 Abdominal pain








- PA / NP / Resident Statement


MD/DO has reviewed & agrees with the documentation as recorded.





- Scribe Statement


The provider has reviewed the documentation as recorded by the Nellie Drew





All medical record entries made by the Nellie were at my direction and 

personally dictated by me. I have reviewed the chart and agree that the record 

accurately reflects my personal performance of the history, physical exam, 

medical decision making, and the department course for this patient. I have also

personally directed, reviewed, and agree with the discharge instructions and 

disposition.

## 2019-03-25 NOTE — CARD
--------------- APPROVED REPORT --------------





Date of service: 03/24/2019



EKG Measurement

Heart [id number]

TX [id number]

[id number]

[id number]

[id number]



<Conclusion>

Normal sinus rhythm

Right superior axis deviation

Abnormal ECG